# Patient Record
Sex: MALE | Race: WHITE | Employment: FULL TIME
[De-identification: names, ages, dates, MRNs, and addresses within clinical notes are randomized per-mention and may not be internally consistent; named-entity substitution may affect disease eponyms.]

---

## 2020-02-23 ENCOUNTER — NURSE TRIAGE (OUTPATIENT)
Dept: OTHER | Facility: CLINIC | Age: 60
End: 2020-02-23

## 2023-10-22 SDOH — HEALTH STABILITY: PHYSICAL HEALTH: ON AVERAGE, HOW MANY MINUTES DO YOU ENGAGE IN EXERCISE AT THIS LEVEL?: 50 MIN

## 2023-10-22 SDOH — HEALTH STABILITY: PHYSICAL HEALTH: ON AVERAGE, HOW MANY DAYS PER WEEK DO YOU ENGAGE IN MODERATE TO STRENUOUS EXERCISE (LIKE A BRISK WALK)?: 5 DAYS

## 2023-10-22 ASSESSMENT — SOCIAL DETERMINANTS OF HEALTH (SDOH)

## 2023-10-25 ENCOUNTER — OFFICE VISIT (OUTPATIENT)
Dept: FAMILY MEDICINE CLINIC | Age: 63
End: 2023-10-25
Payer: MEDICARE

## 2023-10-25 VITALS
WEIGHT: 210 LBS | OXYGEN SATURATION: 98 % | BODY MASS INDEX: 30.06 KG/M2 | SYSTOLIC BLOOD PRESSURE: 136 MMHG | DIASTOLIC BLOOD PRESSURE: 80 MMHG | HEART RATE: 62 BPM | RESPIRATION RATE: 16 BRPM | HEIGHT: 70 IN

## 2023-10-25 DIAGNOSIS — Z13.220 ENCOUNTER FOR SCREENING FOR LIPID DISORDER: ICD-10-CM

## 2023-10-25 DIAGNOSIS — I10 PRIMARY HYPERTENSION: Primary | ICD-10-CM

## 2023-10-25 DIAGNOSIS — Z12.5 SCREENING FOR PROSTATE CANCER: ICD-10-CM

## 2023-10-25 DIAGNOSIS — B35.6 TINEA CRURIS: ICD-10-CM

## 2023-10-25 PROCEDURE — 3079F DIAST BP 80-89 MM HG: CPT | Performed by: FAMILY MEDICINE

## 2023-10-25 PROCEDURE — G8417 CALC BMI ABV UP PARAM F/U: HCPCS | Performed by: FAMILY MEDICINE

## 2023-10-25 PROCEDURE — 3017F COLORECTAL CA SCREEN DOC REV: CPT | Performed by: FAMILY MEDICINE

## 2023-10-25 PROCEDURE — 3075F SYST BP GE 130 - 139MM HG: CPT | Performed by: FAMILY MEDICINE

## 2023-10-25 PROCEDURE — 99214 OFFICE O/P EST MOD 30 MIN: CPT | Performed by: FAMILY MEDICINE

## 2023-10-25 PROCEDURE — G8427 DOCREV CUR MEDS BY ELIG CLIN: HCPCS | Performed by: FAMILY MEDICINE

## 2023-10-25 PROCEDURE — G8484 FLU IMMUNIZE NO ADMIN: HCPCS | Performed by: FAMILY MEDICINE

## 2023-10-25 PROCEDURE — 1036F TOBACCO NON-USER: CPT | Performed by: FAMILY MEDICINE

## 2023-10-25 RX ORDER — CITALOPRAM 20 MG/1
20 TABLET ORAL DAILY
COMMUNITY
Start: 2023-08-01

## 2023-10-25 RX ORDER — AMLODIPINE BESYLATE 5 MG/1
5 TABLET ORAL DAILY
COMMUNITY
Start: 2023-08-08

## 2023-10-25 RX ORDER — ESCITALOPRAM OXALATE 20 MG/1
10 TABLET ORAL DAILY
COMMUNITY

## 2023-10-25 RX ORDER — LISINOPRIL 5 MG/1
5 TABLET ORAL DAILY
COMMUNITY
Start: 2023-08-08

## 2023-10-25 SDOH — ECONOMIC STABILITY: FOOD INSECURITY: WITHIN THE PAST 12 MONTHS, THE FOOD YOU BOUGHT JUST DIDN'T LAST AND YOU DIDN'T HAVE MONEY TO GET MORE.: NEVER TRUE

## 2023-10-25 SDOH — ECONOMIC STABILITY: HOUSING INSECURITY
IN THE LAST 12 MONTHS, WAS THERE A TIME WHEN YOU DID NOT HAVE A STEADY PLACE TO SLEEP OR SLEPT IN A SHELTER (INCLUDING NOW)?: NO

## 2023-10-25 SDOH — ECONOMIC STABILITY: FOOD INSECURITY: WITHIN THE PAST 12 MONTHS, YOU WORRIED THAT YOUR FOOD WOULD RUN OUT BEFORE YOU GOT MONEY TO BUY MORE.: NEVER TRUE

## 2023-10-25 SDOH — ECONOMIC STABILITY: INCOME INSECURITY: HOW HARD IS IT FOR YOU TO PAY FOR THE VERY BASICS LIKE FOOD, HOUSING, MEDICAL CARE, AND HEATING?: NOT HARD AT ALL

## 2023-10-25 ASSESSMENT — PATIENT HEALTH QUESTIONNAIRE - PHQ9
SUM OF ALL RESPONSES TO PHQ QUESTIONS 1-9: 0
2. FEELING DOWN, DEPRESSED OR HOPELESS: 0
SUM OF ALL RESPONSES TO PHQ9 QUESTIONS 1 & 2: 0
1. LITTLE INTEREST OR PLEASURE IN DOING THINGS: 0
SUM OF ALL RESPONSES TO PHQ QUESTIONS 1-9: 0

## 2023-10-25 ASSESSMENT — ENCOUNTER SYMPTOMS
WHEEZING: 0
NAUSEA: 0
DIARRHEA: 0
CONSTIPATION: 0
VOMITING: 0
ABDOMINAL PAIN: 0
SHORTNESS OF BREATH: 0
COUGH: 0

## 2023-10-25 NOTE — PROGRESS NOTES
Sylvester Manuel (:  1960) is a 61 y.o. male,Established patient, here for evaluation of the following chief complaint(s):  Establish Care (No concerns)      Subjective   SUBJECTIVE/OBJECTIVE:  HPI    BPs have been fine when checking, slightly elevated today but just had a stressful meeting at work  Still working out 3-5 times per week (doing some cardio and strength training), has increased his protein but hasn't noticed any improvement in his muscle mass  Tolerating medications well  Taking medications every day yes, compliant  Last Cr 0.9  Last K 4.1  Last LDL 96  Last glucose 92  Additional concerns tinea cruris (responded well in the past to nystatin-triamcinolone cream)     Review of Systems   Constitutional:  Negative for activity change, fatigue and unexpected weight change. Respiratory:  Negative for cough, shortness of breath and wheezing. Cardiovascular:  Negative for chest pain, palpitations and leg swelling. Gastrointestinal:  Negative for abdominal pain, constipation, diarrhea, nausea and vomiting. Skin:  Positive for rash (mild itching around rectum and genitals). Neurological:  Negative for dizziness, light-headedness and headaches. Objective   Physical Exam  Constitutional:       Appearance: Normal appearance. Neck:      Vascular: No carotid bruit. Cardiovascular:      Rate and Rhythm: Normal rate and regular rhythm. Heart sounds: No murmur heard. No gallop. Pulmonary:      Effort: Pulmonary effort is normal.      Breath sounds: Normal breath sounds. No wheezing, rhonchi or rales. Abdominal:      General: Abdomen is flat. Bowel sounds are normal. There is no distension. Palpations: Abdomen is soft. Tenderness: There is no abdominal tenderness. There is no guarding. Musculoskeletal:      Right lower leg: No edema. Left lower leg: No edema. Skin:     General: Skin is warm. Neurological:      Mental Status: He is alert.    Psychiatric:

## 2024-04-22 LAB
ALBUMIN SERPL-MCNC: 4.5 G/DL
ALP BLD-CCNC: 92 U/L
ALT SERPL-CCNC: 61 U/L
ANION GAP SERPL CALCULATED.3IONS-SCNC: 7 MMOL/L
AST SERPL-CCNC: 41 U/L
BASOPHILS ABSOLUTE: 0.05 /ΜL
BASOPHILS RELATIVE PERCENT: 1 %
BILIRUB SERPL-MCNC: 0.4 MG/DL (ref 0.1–1.4)
BUN BLDV-MCNC: 21 MG/DL
CALCIUM SERPL-MCNC: 9.4 MG/DL
CHLORIDE BLD-SCNC: 103 MMOL/L
CHOLESTEROL, TOTAL: 189 MG/DL
CHOLESTEROL/HDL RATIO: 4.1
CO2: 29 MMOL/L
CREAT SERPL-MCNC: 1.03 MG/DL
EGFR: 82
EOSINOPHILS ABSOLUTE: 0.32 /ΜL
EOSINOPHILS RELATIVE PERCENT: 6.2 %
GLUCOSE BLD-MCNC: 96 MG/DL
HCT VFR BLD CALC: 46.5 % (ref 41–53)
HDLC SERPL-MCNC: 46 MG/DL (ref 35–70)
HEMOGLOBIN: 16 G/DL (ref 13.5–17.5)
LDL CHOLESTEROL CALCULATED: 116 MG/DL (ref 0–160)
LYMPHOCYTES ABSOLUTE: 1.72 /ΜL
LYMPHOCYTES RELATIVE PERCENT: 33.4 %
MCH RBC QN AUTO: 32.7 PG
MCHC RBC AUTO-ENTMCNC: 34.4 G/DL
MCV RBC AUTO: 95.1 FL
MONOCYTES ABSOLUTE: 0.6 /ΜL
MONOCYTES RELATIVE PERCENT: 11.7 %
NEUTROPHILS ABSOLUTE: 2.44 /ΜL
NEUTROPHILS RELATIVE PERCENT: 47.3 %
NONHDLC SERPL-MCNC: NORMAL MG/DL
PDW BLD-RTO: 12.2 %
PLATELET # BLD: 255 K/ΜL
PMV BLD AUTO: 10.1 FL
POTASSIUM SERPL-SCNC: 4.7 MMOL/L
PROSTATE SPECIFIC ANTIGEN: 1.53 NG/ML
RBC # BLD: 4.89 10^6/ΜL
SODIUM BLD-SCNC: 139 MMOL/L
TOTAL PROTEIN: 6.9
TRIGL SERPL-MCNC: 135 MG/DL
VLDLC SERPL CALC-MCNC: 27 MG/DL
WBC # BLD: 5.2 10^3/ML

## 2024-04-23 ENCOUNTER — TELEPHONE (OUTPATIENT)
Dept: FAMILY MEDICINE CLINIC | Age: 64
End: 2024-04-23

## 2024-04-23 DIAGNOSIS — I10 PRIMARY HYPERTENSION: ICD-10-CM

## 2024-04-23 DIAGNOSIS — Z12.5 SCREENING FOR PROSTATE CANCER: ICD-10-CM

## 2024-04-23 DIAGNOSIS — Z13.220 ENCOUNTER FOR SCREENING FOR LIPID DISORDER: ICD-10-CM

## 2024-04-23 RX ORDER — CITALOPRAM 20 MG/1
20 TABLET ORAL DAILY
Qty: 90 TABLET | Refills: 3 | Status: SHIPPED | OUTPATIENT
Start: 2024-04-23

## 2024-04-23 NOTE — TELEPHONE ENCOUNTER
Pt called in asking for RF on Citalopram 20mg be sent to [facility] pharmacy.

## 2024-05-08 ENCOUNTER — TELEPHONE (OUTPATIENT)
Dept: FAMILY MEDICINE CLINIC | Age: 64
End: 2024-05-08

## 2024-05-08 DIAGNOSIS — I10 PRIMARY HYPERTENSION: Primary | ICD-10-CM

## 2024-05-08 RX ORDER — AMLODIPINE BESYLATE 5 MG/1
5 TABLET ORAL DAILY
Qty: 90 TABLET | Refills: 3 | Status: SHIPPED | OUTPATIENT
Start: 2024-05-08

## 2024-05-08 RX ORDER — LISINOPRIL 5 MG/1
5 TABLET ORAL DAILY
Qty: 90 TABLET | Refills: 3 | Status: SHIPPED | OUTPATIENT
Start: 2024-05-08

## 2024-05-08 NOTE — TELEPHONE ENCOUNTER
The patient called and said that he needs a refill on his amlodipine 5 mg, takes one tablet daily and lisinopril 5 mg, takes one tablet daily. He would like a 90 day supply with refills for a year sent to [facility] in [place], [place].

## 2024-05-08 NOTE — TELEPHONE ENCOUNTER
I called Antwan and let him know that Dr. Gordon sent in refills for his Lisinopril and Amlodipine and stated that he needed an annual physical due next month. Antwan scheduled the annual physical for 6/26/2024 and stated he already had some labs done in April.

## 2024-05-22 ENCOUNTER — OFFICE VISIT (OUTPATIENT)
Dept: FAMILY MEDICINE CLINIC | Age: 64
End: 2024-05-22
Payer: MEDICARE

## 2024-05-22 VITALS
OXYGEN SATURATION: 96 % | RESPIRATION RATE: 14 BRPM | BODY MASS INDEX: 29.35 KG/M2 | DIASTOLIC BLOOD PRESSURE: 80 MMHG | WEIGHT: 205 LBS | TEMPERATURE: 97.5 F | SYSTOLIC BLOOD PRESSURE: 132 MMHG | HEIGHT: 70 IN | HEART RATE: 78 BPM

## 2024-05-22 DIAGNOSIS — M25.561 ACUTE PAIN OF RIGHT KNEE: Primary | ICD-10-CM

## 2024-05-22 PROCEDURE — 1036F TOBACCO NON-USER: CPT | Performed by: FAMILY MEDICINE

## 2024-05-22 PROCEDURE — 3017F COLORECTAL CA SCREEN DOC REV: CPT | Performed by: FAMILY MEDICINE

## 2024-05-22 PROCEDURE — 3079F DIAST BP 80-89 MM HG: CPT | Performed by: FAMILY MEDICINE

## 2024-05-22 PROCEDURE — 3075F SYST BP GE 130 - 139MM HG: CPT | Performed by: FAMILY MEDICINE

## 2024-05-22 PROCEDURE — G8427 DOCREV CUR MEDS BY ELIG CLIN: HCPCS | Performed by: FAMILY MEDICINE

## 2024-05-22 PROCEDURE — 99213 OFFICE O/P EST LOW 20 MIN: CPT | Performed by: FAMILY MEDICINE

## 2024-05-22 PROCEDURE — G8417 CALC BMI ABV UP PARAM F/U: HCPCS | Performed by: FAMILY MEDICINE

## 2024-05-22 ASSESSMENT — PATIENT HEALTH QUESTIONNAIRE - PHQ9
2. FEELING DOWN, DEPRESSED OR HOPELESS: NOT AT ALL
SUM OF ALL RESPONSES TO PHQ QUESTIONS 1-9: 0
1. LITTLE INTEREST OR PLEASURE IN DOING THINGS: NOT AT ALL
SUM OF ALL RESPONSES TO PHQ QUESTIONS 1-9: 0
SUM OF ALL RESPONSES TO PHQ9 QUESTIONS 1 & 2: 0

## 2024-05-22 ASSESSMENT — ENCOUNTER SYMPTOMS
BACK PAIN: 0
WHEEZING: 0
COUGH: 0
SHORTNESS OF BREATH: 0

## 2024-05-22 NOTE — PROGRESS NOTES
Alon Leal (:  1960) is a 63 y.o. 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   Respiratory:  Negative for cough, shortness of breath and wheezing.    Cardiovascular:  Negative for chest pain, palpitations and leg swelling.   Genitourinary:         No bowel or bladder changes   Musculoskeletal:  Negative for arthralgias, back pain, gait problem and myalgias.        R knee pain, mild discomfort R upper thigh          Objective   Physical Exam  Constitutional:       Appearance: He is obese.   HENT:      Head: Normocephalic and atraumatic.   Cardiovascular:      Rate and Rhythm: Normal rate and regular rhythm.   Pulmonary:      Effort: Pulmonary effort is normal.      Breath sounds: Normal breath sounds.   Musculoskeletal:         General: Swelling (mild swelling of the R knee, fullness of the right thigh) present.      Right lower leg: No edema.      Left lower leg: No edema.      Comments: MS:  No appreciated ligament laxity, no crepitus with motion through both knees   Neurological:      Mental Status: He is alert.            Assessment & Plan   ASSESSMENT/PLAN:  1. Acute pain of right knee  -     XR KNEE RIGHT (3 VIEWS); Future  Discussed with patient that this may just be from

## 2024-06-10 ENCOUNTER — TELEPHONE (OUTPATIENT)
Dept: FAMILY MEDICINE CLINIC | Age: 64
End: 2024-06-10

## 2024-06-10 ENCOUNTER — HOSPITAL ENCOUNTER (OUTPATIENT)
Age: 64
Discharge: HOME | End: 2024-06-10
Payer: COMMERCIAL

## 2024-06-10 ENCOUNTER — HOSPITAL ENCOUNTER (OUTPATIENT)
Dept: GENERAL RADIOLOGY | Age: 64
Discharge: HOME | End: 2024-06-10
Payer: COMMERCIAL

## 2024-06-10 DIAGNOSIS — M25.561 ACUTE PAIN OF RIGHT KNEE: ICD-10-CM

## 2024-06-10 PROCEDURE — 73562 X-RAY EXAM OF KNEE 3: CPT

## 2024-06-10 NOTE — TELEPHONE ENCOUNTER
----- Message from CJ Mondragon CNP sent at 6/10/2024 11:34 AM EDT -----  Chronic degenerative changes noted in the knee. No fracture or acute change noted.

## 2024-06-10 NOTE — TELEPHONE ENCOUNTER
I copy and pasted Елена's response regarding the patient's X Ray Knee Right results and sent them to the patient via My Chart.

## 2024-06-11 ENCOUNTER — TELEPHONE (OUTPATIENT)
Dept: FAMILY MEDICINE CLINIC | Age: 64
End: 2024-06-11

## 2024-06-11 NOTE — TELEPHONE ENCOUNTER
----- Message from Saritashad Cox Shawnwa sent at 6/11/2024  8:19 AM EDT -----  Regarding: ECC Message to Provider  ECC Message to Provider    Relationship to Patient: Self     Additional Information Pt wanted the practice or his pcp to call him with regards to his X ray.  --------------------------------------------------------------------------------------------------------------------------    Call Back Information: OK to leave message on voicemail  Preferred Call Back Number: Phone  [phone]

## 2024-06-26 ENCOUNTER — PATIENT MESSAGE (OUTPATIENT)
Dept: FAMILY MEDICINE CLINIC | Age: 64
End: 2024-06-26

## 2024-06-26 DIAGNOSIS — M25.561 ACUTE PAIN OF RIGHT KNEE: Primary | ICD-10-CM

## 2024-06-26 NOTE — TELEPHONE ENCOUNTER
From: Alon Leal  To: Dr. Belinda Gordon  Sent: 6/26/2024 2:40 PM EDT  Subject: Physical Therapy for my Knee    Would you please send a script for my knee therapy to   [facility]  Fax: [phone]  Attn Belinda Mack  Thank you

## 2024-07-11 ENCOUNTER — OFFICE VISIT (OUTPATIENT)
Dept: FAMILY MEDICINE CLINIC | Age: 64
End: 2024-07-11
Payer: COMMERCIAL

## 2024-07-11 VITALS
HEIGHT: 70 IN | HEART RATE: 80 BPM | RESPIRATION RATE: 14 BRPM | BODY MASS INDEX: 28.92 KG/M2 | DIASTOLIC BLOOD PRESSURE: 78 MMHG | SYSTOLIC BLOOD PRESSURE: 136 MMHG | WEIGHT: 202 LBS | TEMPERATURE: 98.5 F | OXYGEN SATURATION: 95 %

## 2024-07-11 DIAGNOSIS — C49.9 LIPOSARCOMA (HCC): ICD-10-CM

## 2024-07-11 DIAGNOSIS — Z86.718 HISTORY OF DVT (DEEP VEIN THROMBOSIS): ICD-10-CM

## 2024-07-11 DIAGNOSIS — I87.2 VENOUS INSUFFICIENCY OF RIGHT LOWER EXTREMITY: Primary | ICD-10-CM

## 2024-07-11 PROCEDURE — 3075F SYST BP GE 130 - 139MM HG: CPT | Performed by: FAMILY MEDICINE

## 2024-07-11 PROCEDURE — 3078F DIAST BP <80 MM HG: CPT | Performed by: FAMILY MEDICINE

## 2024-07-11 PROCEDURE — 1036F TOBACCO NON-USER: CPT | Performed by: FAMILY MEDICINE

## 2024-07-11 PROCEDURE — 3017F COLORECTAL CA SCREEN DOC REV: CPT | Performed by: FAMILY MEDICINE

## 2024-07-11 PROCEDURE — 99213 OFFICE O/P EST LOW 20 MIN: CPT | Performed by: FAMILY MEDICINE

## 2024-07-11 PROCEDURE — G8427 DOCREV CUR MEDS BY ELIG CLIN: HCPCS | Performed by: FAMILY MEDICINE

## 2024-07-11 PROCEDURE — G8417 CALC BMI ABV UP PARAM F/U: HCPCS | Performed by: FAMILY MEDICINE

## 2024-07-11 ASSESSMENT — ENCOUNTER SYMPTOMS
NAUSEA: 0
VOMITING: 0
WHEEZING: 0
COUGH: 0
DIARRHEA: 0
SHORTNESS OF BREATH: 0
ABDOMINAL PAIN: 0
CONSTIPATION: 0

## 2024-07-11 NOTE — PROGRESS NOTES
Health Maintenance Due   Topic Date Due    COVID-19 Vaccine (1) Never done    HIV screen  Never done    Hepatitis C screen  Never done    DTaP/Tdap/Td vaccine (1 - Tdap) Never done    Colorectal Cancer Screen  Done, records being obtained    Respiratory Syncytial Virus (RSV) Pregnant or age 60 yrs+ (1 - 1-dose 60+ series) Never done      Health maintenance reviewed 7/11/2024. Patient health hx and surgeries reviewed. No changes.  
lower leg: No edema.      Left lower leg: No edema.      Comments: No appreciated swelling of the knee joint on the right  R upper thigh looks slightly larger than the left thigh, no appreciated firmness or tenderness (patient reports better with compression)   Skin:     General: Skin is warm.      Findings: No erythema, lesion or rash.   Neurological:      Mental Status: He is alert.   Psychiatric:         Mood and Affect: Mood normal.            Assessment & Plan   ASSESSMENT/PLAN:  1. Venous insufficiency of right lower extremity  2. History of Liposarcoma  3. History of DVT (deep vein thrombosis)  Discussed with patient that low concern about blood clot or recurrence at this time.  Suspect problems are related to venous damage from old mass that was removed, especially since improved with two days of compression.  Discussed seeing vein specialist if symptoms persist for ultrasound evaluation and other treatment options.  Consider MRI if any firmness developed.  Patient will call if any changes or referral needed    Return if symptoms worsen or fail to improve.         On this date 7/11/2024 I have spent 20 minutes reviewing previous notes, test results and face to face with the patient discussing the diagnosis and importance of compliance with the treatment plan as well as documenting on the day of the visit.      An electronic signature was used to authenticate this note.    --Belinda Gordon, DO

## 2024-07-29 ENCOUNTER — PATIENT MESSAGE (OUTPATIENT)
Dept: FAMILY MEDICINE CLINIC | Age: 64
End: 2024-07-29

## 2024-07-29 DIAGNOSIS — I87.2 VENOUS INSUFFICIENCY OF RIGHT LOWER EXTREMITY: Primary | ICD-10-CM

## 2024-07-29 DIAGNOSIS — Z86.718 HISTORY OF DVT (DEEP VEIN THROMBOSIS): ICD-10-CM

## 2024-07-29 DIAGNOSIS — C49.9 LIPOSARCOMA (HCC): ICD-10-CM

## 2024-08-01 ENCOUNTER — PATIENT MESSAGE (OUTPATIENT)
Dept: FAMILY MEDICINE CLINIC | Age: 64
End: 2024-08-01

## 2024-08-01 DIAGNOSIS — C49.9 LIPOSARCOMA (HCC): Primary | ICD-10-CM

## 2024-08-01 DIAGNOSIS — R22.41 LEG MASS, RIGHT: ICD-10-CM

## 2024-08-01 NOTE — TELEPHONE ENCOUNTER
From: Alon Leal  To: Dr. Belinda Gordon  Sent: 8/1/2024 7:49 AM EDT  Subject: Right Thigh    Dr Gordon:  I had my appointment at the [facility] yesterday afternoon. They did their ultrasound and was not able to find anything that would cause the increase size.  All the veins looked good no blood clots. It is 9CM larger than the left one.  What do you suggest I do not notice it get any larger in size. If you really think I need the MRI as much as I don't want to I will. If you do suggest that I would like to go to an open MRI unit I guess [facility] has one.    Thank you

## 2024-08-01 NOTE — TELEPHONE ENCOUNTER
If they didn't find any reason for the increase in size then I would recommend doing an MRI at this time---I can get it ordered and sent to [facility]

## 2024-08-02 ENCOUNTER — PATIENT MESSAGE (OUTPATIENT)
Dept: FAMILY MEDICINE CLINIC | Age: 64
End: 2024-08-02

## 2024-08-02 DIAGNOSIS — R22.41 LEG MASS, RIGHT: Primary | ICD-10-CM

## 2024-08-02 DIAGNOSIS — C49.9 LIPOSARCOMA (HCC): ICD-10-CM

## 2024-08-05 NOTE — TELEPHONE ENCOUNTER
We have already start the prior authorization for the MRI at [facility] (I just forgot to put a note in the order that you weren't doing it at [facility])---we are still waiting on your insurance for the approval code

## 2024-08-07 NOTE — TELEPHONE ENCOUNTER
We haven't received the paperwork stating why it was denied yet---just that it was denied.  They often send us something within a few days  I would start with seeing someone at [facility] (they may be able to do an ultrasound to get a better measurement of the thigh so it can be followed better than we can do with just the naked eye)  I can do a referral to them if you would like to go that route rather than going back to see Dr Pierce

## 2024-08-21 ENCOUNTER — PATIENT MESSAGE (OUTPATIENT)
Dept: FAMILY MEDICINE CLINIC | Age: 64
End: 2024-08-21

## 2024-11-04 SDOH — ECONOMIC STABILITY: INCOME INSECURITY: HOW HARD IS IT FOR YOU TO PAY FOR THE VERY BASICS LIKE FOOD, HOUSING, MEDICAL CARE, AND HEATING?: PATIENT DECLINED

## 2024-11-04 SDOH — ECONOMIC STABILITY: FOOD INSECURITY: WITHIN THE PAST 12 MONTHS, YOU WORRIED THAT YOUR FOOD WOULD RUN OUT BEFORE YOU GOT MONEY TO BUY MORE.: PATIENT DECLINED

## 2024-11-04 SDOH — ECONOMIC STABILITY: TRANSPORTATION INSECURITY
IN THE PAST 12 MONTHS, HAS LACK OF TRANSPORTATION KEPT YOU FROM MEETINGS, WORK, OR FROM GETTING THINGS NEEDED FOR DAILY LIVING?: PATIENT DECLINED

## 2024-11-04 SDOH — ECONOMIC STABILITY: FOOD INSECURITY: WITHIN THE PAST 12 MONTHS, THE FOOD YOU BOUGHT JUST DIDN'T LAST AND YOU DIDN'T HAVE MONEY TO GET MORE.: PATIENT DECLINED

## 2024-11-05 ENCOUNTER — OFFICE VISIT (OUTPATIENT)
Dept: FAMILY MEDICINE CLINIC | Age: 64
End: 2024-11-05

## 2024-11-05 VITALS
SYSTOLIC BLOOD PRESSURE: 144 MMHG | OXYGEN SATURATION: 93 % | RESPIRATION RATE: 16 BRPM | BODY MASS INDEX: 29.63 KG/M2 | TEMPERATURE: 98 F | HEIGHT: 70 IN | DIASTOLIC BLOOD PRESSURE: 82 MMHG | HEART RATE: 77 BPM | WEIGHT: 207 LBS

## 2024-11-05 DIAGNOSIS — H61.23 BILATERAL IMPACTED CERUMEN: Primary | Chronic | ICD-10-CM

## 2024-11-05 DIAGNOSIS — H92.03 DISCOMFORT OF BOTH EARS: ICD-10-CM

## 2024-11-05 DIAGNOSIS — C49.21: ICD-10-CM

## 2024-11-05 RX ORDER — CYCLOBENZAPRINE HCL 5 MG
5 TABLET ORAL 3 TIMES DAILY PRN
COMMUNITY
Start: 2024-10-02

## 2024-11-05 RX ORDER — OMEPRAZOLE 10 MG/1
5 CAPSULE, DELAYED RELEASE ORAL DAILY
COMMUNITY

## 2024-11-05 ASSESSMENT — ENCOUNTER SYMPTOMS
WHEEZING: 0
CONSTIPATION: 0
SINUS PAIN: 0
SHORTNESS OF BREATH: 0
NAUSEA: 0
RHINORRHEA: 1
TROUBLE SWALLOWING: 0
DIARRHEA: 0
SINUS PRESSURE: 0
CHEST TIGHTNESS: 0
VOMITING: 0
SORE THROAT: 0

## 2024-11-05 NOTE — PROGRESS NOTES
Alon Leal (:  1960) is a 64 y.o. male,Established patient, here for evaluation of the following chief complaint(s):  Ear Fullness      Subjective   SUBJECTIVE/OBJECTIVE:  HPI  Patient presents with ear symptoms for several weeks---pressure bilaterally, no noted hearing difficulties  Known exposures problems with wax buildup  Treatment nothing  Feels fine, still exercising  Some mild allergy symptoms with the weather changing  Healing from recent lipoma removal    Review of Systems   Constitutional:  Positive for fatigue. Negative for activity change, chills and fever.   HENT:  Positive for ear pain (discomfort, fullness) and rhinorrhea. Negative for congestion, postnasal drip, sinus pressure, sinus pain, sneezing, sore throat and trouble swallowing.    Respiratory:  Negative for chest tightness, shortness of breath and wheezing.    Cardiovascular:  Negative for chest pain and leg swelling.   Gastrointestinal:  Negative for constipation, diarrhea, nausea and vomiting.   Musculoskeletal:  Negative for arthralgias.   Skin:  Negative for rash.   Neurological:  Negative for dizziness, light-headedness and headaches.   Hematological:  Negative for adenopathy.          Objective   Physical Exam  Constitutional:       General: He is not in acute distress.     Appearance: Normal appearance. He is normal weight. He is not ill-appearing.   HENT:      Head: Normocephalic and atraumatic.      Right Ear: There is impacted cerumen.      Left Ear: There is impacted cerumen.      Nose: Rhinorrhea present. No mucosal edema or congestion. Rhinorrhea is clear.      Right Turbinates: Swollen.      Left Turbinates: Swollen.      Right Sinus: No maxillary sinus tenderness or frontal sinus tenderness.      Left Sinus: No maxillary sinus tenderness or frontal sinus tenderness.   Eyes:      Extraocular Movements: Extraocular movements intact.      Pupils: Pupils are equal, round, and reactive to light.   Cardiovascular:

## 2024-11-20 ENCOUNTER — OFFICE VISIT (OUTPATIENT)
Dept: FAMILY MEDICINE CLINIC | Age: 64
End: 2024-11-20
Payer: COMMERCIAL

## 2024-11-20 VITALS
WEIGHT: 201 LBS | RESPIRATION RATE: 14 BRPM | TEMPERATURE: 97.4 F | HEIGHT: 70 IN | SYSTOLIC BLOOD PRESSURE: 132 MMHG | OXYGEN SATURATION: 98 % | DIASTOLIC BLOOD PRESSURE: 74 MMHG | HEART RATE: 76 BPM | BODY MASS INDEX: 28.77 KG/M2

## 2024-11-20 DIAGNOSIS — J40 BRONCHITIS: Primary | ICD-10-CM

## 2024-11-20 PROCEDURE — 3075F SYST BP GE 130 - 139MM HG: CPT | Performed by: FAMILY MEDICINE

## 2024-11-20 PROCEDURE — 99213 OFFICE O/P EST LOW 20 MIN: CPT | Performed by: FAMILY MEDICINE

## 2024-11-20 PROCEDURE — 3078F DIAST BP <80 MM HG: CPT | Performed by: FAMILY MEDICINE

## 2024-11-20 RX ORDER — AZITHROMYCIN 250 MG/1
TABLET, FILM COATED ORAL
Qty: 6 TABLET | Refills: 0 | Status: SHIPPED | OUTPATIENT
Start: 2024-11-20 | End: 2024-11-25

## 2024-11-20 ASSESSMENT — ENCOUNTER SYMPTOMS
TROUBLE SWALLOWING: 0
DIARRHEA: 0
RHINORRHEA: 1
SHORTNESS OF BREATH: 0
WHEEZING: 0
SINUS PRESSURE: 0
SORE THROAT: 1
CONSTIPATION: 0
CHEST TIGHTNESS: 0
SINUS PAIN: 0
COUGH: 1
VOMITING: 0
NAUSEA: 0

## 2024-11-20 NOTE — PROGRESS NOTES
Alon Leal (:  1960) is a 64 y.o. male,Established patient, here for evaluation of the following chief complaint(s):  Cough, Congestion, and Ear Fullness      Subjective   SUBJECTIVE/OBJECTIVE:  HPI  Patient presents with cold symptoms for 9 days  Known exposures weather changes, wife with cold symptoms initially  Treatment Nyquil and Dayquil  Better mild help with meds  Worse last few days, coughing more    Review of Systems   Constitutional:  Positive for fatigue. Negative for activity change, chills and fever.   HENT:  Positive for congestion, ear pain (pressure bilaterally), rhinorrhea and sore throat (scratchy). Negative for postnasal drip, sinus pressure, sinus pain, sneezing and trouble swallowing.    Respiratory:  Positive for cough. Negative for chest tightness, shortness of breath and wheezing.    Cardiovascular:  Negative for chest pain, palpitations and leg swelling.   Gastrointestinal:  Negative for constipation, diarrhea, nausea and vomiting.   Musculoskeletal:  Negative for arthralgias.   Skin:  Negative for rash.   Neurological:  Positive for dizziness (with motion, mild). Negative for light-headedness and headaches.   Hematological:  Negative for adenopathy.          Objective   Physical Exam  Vitals reviewed.   Constitutional:       General: He is not in acute distress.     Appearance: Normal appearance. He is normal weight. He is not ill-appearing.   HENT:      Head: Normocephalic and atraumatic.      Right Ear: A middle ear effusion is present.      Left Ear: A middle ear effusion is present.      Nose: Mucosal edema, congestion and rhinorrhea present. Rhinorrhea is purulent.      Right Turbinates: Swollen.      Left Turbinates: Swollen.      Right Sinus: No maxillary sinus tenderness or frontal sinus tenderness.      Left Sinus: No maxillary sinus tenderness or frontal sinus tenderness.      Mouth/Throat:      Pharynx: Posterior oropharyngeal erythema (posterior cobblestoning)

## 2024-12-02 ENCOUNTER — PATIENT MESSAGE (OUTPATIENT)
Dept: FAMILY MEDICINE CLINIC | Age: 64
End: 2024-12-02

## 2024-12-02 RX ORDER — NYSTATIN AND TRIAMCINOLONE ACETONIDE 100000; 1 [USP'U]/G; MG/G
CREAM TOPICAL
Qty: 60 G | Refills: 1 | Status: SHIPPED | OUTPATIENT
Start: 2024-12-02

## 2024-12-09 ENCOUNTER — PATIENT MESSAGE (OUTPATIENT)
Dept: FAMILY MEDICINE CLINIC | Age: 64
End: 2024-12-09

## 2024-12-09 DIAGNOSIS — I10 PRIMARY HYPERTENSION: ICD-10-CM

## 2024-12-09 RX ORDER — LISINOPRIL 5 MG/1
10 TABLET ORAL DAILY
Qty: 90 TABLET | Refills: 3 | Status: SHIPPED
Start: 2024-12-09 | End: 2024-12-12 | Stop reason: SDUPTHER

## 2024-12-12 ENCOUNTER — OFFICE VISIT (OUTPATIENT)
Dept: FAMILY MEDICINE CLINIC | Age: 64
End: 2024-12-12
Payer: COMMERCIAL

## 2024-12-12 VITALS
DIASTOLIC BLOOD PRESSURE: 78 MMHG | OXYGEN SATURATION: 95 % | BODY MASS INDEX: 28.84 KG/M2 | WEIGHT: 201 LBS | SYSTOLIC BLOOD PRESSURE: 148 MMHG | TEMPERATURE: 98.6 F | HEART RATE: 71 BPM

## 2024-12-12 DIAGNOSIS — J40 BRONCHITIS: Primary | ICD-10-CM

## 2024-12-12 DIAGNOSIS — I10 PRIMARY HYPERTENSION: ICD-10-CM

## 2024-12-12 PROBLEM — M17.11 PRIMARY OSTEOARTHRITIS OF RIGHT KNEE: Status: ACTIVE | Noted: 2024-12-12

## 2024-12-12 PROCEDURE — 3078F DIAST BP <80 MM HG: CPT | Performed by: FAMILY MEDICINE

## 2024-12-12 PROCEDURE — 99213 OFFICE O/P EST LOW 20 MIN: CPT | Performed by: FAMILY MEDICINE

## 2024-12-12 PROCEDURE — 3077F SYST BP >= 140 MM HG: CPT | Performed by: FAMILY MEDICINE

## 2024-12-12 RX ORDER — ALBUTEROL SULFATE 90 UG/1
2 INHALANT RESPIRATORY (INHALATION) 4 TIMES DAILY PRN
Qty: 18 G | Refills: 0 | Status: SHIPPED | OUTPATIENT
Start: 2024-12-12

## 2024-12-12 RX ORDER — LISINOPRIL 5 MG/1
10 TABLET ORAL DAILY
Qty: 180 TABLET | Refills: 3 | Status: SHIPPED | OUTPATIENT
Start: 2024-12-12

## 2024-12-12 RX ORDER — CEPHALEXIN 500 MG/1
500 CAPSULE ORAL 2 TIMES DAILY
Qty: 20 CAPSULE | Refills: 0 | Status: SHIPPED | OUTPATIENT
Start: 2024-12-12 | End: 2024-12-22

## 2024-12-12 ASSESSMENT — ENCOUNTER SYMPTOMS
SORE THROAT: 1
WHEEZING: 0
DIARRHEA: 0
SHORTNESS OF BREATH: 0
RHINORRHEA: 1
CONSTIPATION: 0
NAUSEA: 0
TROUBLE SWALLOWING: 0
VOMITING: 0
CHEST TIGHTNESS: 0
SINUS PRESSURE: 1
SINUS PAIN: 0
COUGH: 1

## 2024-12-12 NOTE — PROGRESS NOTES
Alon Leal (:  1960) is a 64 y.o. male,Established patient, here for evaluation of the following chief complaint(s):  Cough (Cough and congestion X 2 days. Coughing keeping patient up at night. Pressure in ears. )      Subjective   SUBJECTIVE/OBJECTIVE:  HPI  Patient presents with cold symptoms for 2 days  Known exposures lots of stress---just found out employee has been using company credit card for personal use  BPs have been 140s/70s at home---just started to do 2 lisinopril  Treatment Mucinex, Nyquil  Better minimal help with meds  Worse into chest today, coughing up yellow/green phlegm, nearly to the point of vomiting with cough  Didn't exercise yesterday due to feeling so poorly (never misses exercise)    Review of Systems   Constitutional:  Positive for activity change and fatigue. Negative for appetite change, chills and fever.   HENT:  Positive for congestion, ear pain (pressure), rhinorrhea, sinus pressure and sore throat. Negative for postnasal drip, sinus pain, sneezing and trouble swallowing.    Respiratory:  Positive for cough. Negative for chest tightness, shortness of breath and wheezing.    Cardiovascular:  Negative for chest pain, palpitations and leg swelling.   Gastrointestinal:  Negative for constipation, diarrhea, nausea and vomiting.   Musculoskeletal:  Negative for arthralgias.   Skin:  Negative for rash.   Neurological:  Positive for dizziness (with motion) and headaches (sinus). Negative for light-headedness.   Hematological:  Negative for adenopathy.          Objective   Physical Exam  Vitals reviewed.   Constitutional:       General: He is not in acute distress.     Appearance: Normal appearance. He is normal weight. He is not ill-appearing.   HENT:      Head: Normocephalic and atraumatic.      Right Ear: A middle ear effusion is present.      Left Ear: A middle ear effusion is present.      Nose: Mucosal edema, congestion and rhinorrhea present. Rhinorrhea is purulent.

## 2025-01-08 ENCOUNTER — PATIENT MESSAGE (OUTPATIENT)
Dept: FAMILY MEDICINE CLINIC | Age: 65
End: 2025-01-08

## 2025-01-08 DIAGNOSIS — I10 PRIMARY HYPERTENSION: ICD-10-CM

## 2025-01-08 RX ORDER — LISINOPRIL 10 MG/1
10 TABLET ORAL DAILY
Qty: 90 TABLET | Refills: 3 | Status: SHIPPED | OUTPATIENT
Start: 2025-01-08

## 2025-01-21 RX ORDER — NYSTATIN AND TRIAMCINOLONE ACETONIDE 100000; 1 [USP'U]/G; MG/G
CREAM TOPICAL
Qty: 60 G | Refills: 0 | Status: SHIPPED | OUTPATIENT
Start: 2025-01-21

## 2025-01-21 NOTE — TELEPHONE ENCOUNTER
Alon Leal needs refill of   Requested Prescriptions     Pending Prescriptions Disp Refills    nystatin-triamcinolone (MYCOLOG II) 450993-0.1 UNIT/GM-% cream [Pharmacy Med Name: Nystatin-Triamcinolone 247628-8.1 UNIT/GM-% External Cream] 60 g 0     Sig: APPLY  CREAM TOPICALLY TWICE DAILY       Last Filled on:  12- #60 g R-1 and sent to [facility] in [place], [place] by Belinda Gordon DO.      Last Visit Date:  12/12/2024    Next Visit Date:  Visit date not found

## 2025-02-25 ENCOUNTER — OFFICE VISIT (OUTPATIENT)
Dept: FAMILY MEDICINE CLINIC | Age: 65
End: 2025-02-25
Payer: COMMERCIAL

## 2025-02-25 VITALS
TEMPERATURE: 98.3 F | BODY MASS INDEX: 28.98 KG/M2 | HEART RATE: 81 BPM | OXYGEN SATURATION: 96 % | SYSTOLIC BLOOD PRESSURE: 160 MMHG | WEIGHT: 202 LBS | DIASTOLIC BLOOD PRESSURE: 82 MMHG

## 2025-02-25 DIAGNOSIS — C49.21: ICD-10-CM

## 2025-02-25 DIAGNOSIS — F41.9 ANXIETY: ICD-10-CM

## 2025-02-25 DIAGNOSIS — I10 PRIMARY HYPERTENSION: Primary | ICD-10-CM

## 2025-02-25 PROCEDURE — 3079F DIAST BP 80-89 MM HG: CPT | Performed by: FAMILY MEDICINE

## 2025-02-25 PROCEDURE — 3077F SYST BP >= 140 MM HG: CPT | Performed by: FAMILY MEDICINE

## 2025-02-25 PROCEDURE — 99215 OFFICE O/P EST HI 40 MIN: CPT | Performed by: FAMILY MEDICINE

## 2025-02-25 RX ORDER — KETOCONAZOLE 20 MG/G
CREAM TOPICAL DAILY
COMMUNITY
Start: 2025-02-19

## 2025-02-25 RX ORDER — LISINOPRIL 5 MG/1
5 TABLET ORAL DAILY
COMMUNITY
Start: 2025-01-23

## 2025-02-25 SDOH — ECONOMIC STABILITY: FOOD INSECURITY: WITHIN THE PAST 12 MONTHS, THE FOOD YOU BOUGHT JUST DIDN'T LAST AND YOU DIDN'T HAVE MONEY TO GET MORE.: NEVER TRUE

## 2025-02-25 SDOH — ECONOMIC STABILITY: FOOD INSECURITY: WITHIN THE PAST 12 MONTHS, YOU WORRIED THAT YOUR FOOD WOULD RUN OUT BEFORE YOU GOT MONEY TO BUY MORE.: NEVER TRUE

## 2025-02-25 ASSESSMENT — ENCOUNTER SYMPTOMS
COUGH: 0
CONSTIPATION: 0
ABDOMINAL PAIN: 0
WHEEZING: 0
DIARRHEA: 0
SHORTNESS OF BREATH: 0
VOMITING: 0
NAUSEA: 0

## 2025-02-25 ASSESSMENT — PATIENT HEALTH QUESTIONNAIRE - PHQ9
SUM OF ALL RESPONSES TO PHQ QUESTIONS 1-9: 7
10. IF YOU CHECKED OFF ANY PROBLEMS, HOW DIFFICULT HAVE THESE PROBLEMS MADE IT FOR YOU TO DO YOUR WORK, TAKE CARE OF THINGS AT HOME, OR GET ALONG WITH OTHER PEOPLE: NOT DIFFICULT AT ALL
1. LITTLE INTEREST OR PLEASURE IN DOING THINGS: MORE THAN HALF THE DAYS
SUM OF ALL RESPONSES TO PHQ QUESTIONS 1-9: 7
5. POOR APPETITE OR OVEREATING: NOT AT ALL
9. THOUGHTS THAT YOU WOULD BE BETTER OFF DEAD, OR OF HURTING YOURSELF: NOT AT ALL
6. FEELING BAD ABOUT YOURSELF - OR THAT YOU ARE A FAILURE OR HAVE LET YOURSELF OR YOUR FAMILY DOWN: SEVERAL DAYS
2. FEELING DOWN, DEPRESSED OR HOPELESS: MORE THAN HALF THE DAYS
7. TROUBLE CONCENTRATING ON THINGS, SUCH AS READING THE NEWSPAPER OR WATCHING TELEVISION: NOT AT ALL
8. MOVING OR SPEAKING SO SLOWLY THAT OTHER PEOPLE COULD HAVE NOTICED. OR THE OPPOSITE, BEING SO FIGETY OR RESTLESS THAT YOU HAVE BEEN MOVING AROUND A LOT MORE THAN USUAL: NOT AT ALL
SUM OF ALL RESPONSES TO PHQ9 QUESTIONS 1 & 2: 4
4. FEELING TIRED OR HAVING LITTLE ENERGY: NOT AT ALL
3. TROUBLE FALLING OR STAYING ASLEEP: MORE THAN HALF THE DAYS

## 2025-02-25 NOTE — PROGRESS NOTES
Alon Leal (:  1960) is a 64 y.o. male,Established patient, here for evaluation of the following chief complaint(s):  Anxiety (Anxiety and depression. /Would like to review MRI results on leg. )      Subjective   SUBJECTIVE/OBJECTIVE:  HPI  Struggling with his anxiety since having MRI done of his lower leg  Doesn't feel like his medications need adjusted just wants to talk about the tests  Reviewed MRI with patient---current was done last week, [facility]   Had surgery done in 10/2024  Discussed with patient that the one mass appears to be the same size as it was prior to surgery  The other mass is showing a much smaller size after the resection, some question about if the mass is getting bigger or if that much was left after having the surgery  The surgeon is concerned that it is a recurrence of the mass, he felt that he removed about 99%  The portion along the biceps femoris heads has been resected per the new MRI report  Patient was also told that he may need radiation and was concerned that meant there was cancer  Has an appointment to talk with his specialist tomorrow at 3 PM    Review of Systems   Constitutional:  Positive for fatigue (mild). Negative for activity change and unexpected weight change.   Respiratory:  Negative for cough, shortness of breath and wheezing.    Cardiovascular:  Negative for chest pain and palpitations.   Gastrointestinal:  Negative for abdominal pain, constipation, diarrhea, nausea and vomiting.   Neurological:  Negative for dizziness, light-headedness and headaches.   Psychiatric/Behavioral:  Positive for sleep disturbance (due to anxiety). The patient is nervous/anxious.           Objective   Physical Exam  Vitals reviewed.   Constitutional:       General: He is not in acute distress.     Appearance: Normal appearance. He is not ill-appearing.   Cardiovascular:      Rate and Rhythm: Normal rate and regular rhythm.      Heart sounds: No murmur

## 2025-04-01 ENCOUNTER — PATIENT MESSAGE (OUTPATIENT)
Dept: FAMILY MEDICINE CLINIC | Age: 65
End: 2025-04-01

## 2025-04-01 DIAGNOSIS — Z12.5 SCREENING FOR PROSTATE CANCER: ICD-10-CM

## 2025-04-01 DIAGNOSIS — Z13.220 ENCOUNTER FOR SCREENING FOR LIPID DISORDER: ICD-10-CM

## 2025-04-01 DIAGNOSIS — I10 PRIMARY HYPERTENSION: Primary | ICD-10-CM

## 2025-04-08 ENCOUNTER — PATIENT MESSAGE (OUTPATIENT)
Dept: FAMILY MEDICINE CLINIC | Age: 65
End: 2025-04-08

## 2025-04-08 DIAGNOSIS — I10 PRIMARY HYPERTENSION: Primary | ICD-10-CM

## 2025-04-08 RX ORDER — LISINOPRIL 10 MG/1
10 TABLET ORAL DAILY
Qty: 90 TABLET | Refills: 2 | Status: SHIPPED | OUTPATIENT
Start: 2025-04-08

## 2025-04-21 ENCOUNTER — PATIENT MESSAGE (OUTPATIENT)
Dept: FAMILY MEDICINE CLINIC | Age: 65
End: 2025-04-21

## 2025-04-21 RX ORDER — CITALOPRAM HYDROBROMIDE 20 MG/1
20 TABLET ORAL DAILY
Qty: 90 TABLET | Refills: 3 | Status: SHIPPED | OUTPATIENT
Start: 2025-04-21

## 2025-04-22 ENCOUNTER — RESULTS FOLLOW-UP (OUTPATIENT)
Dept: FAMILY MEDICINE CLINIC | Age: 65
End: 2025-04-22

## 2025-04-22 DIAGNOSIS — Z12.5 SCREENING FOR PROSTATE CANCER: ICD-10-CM

## 2025-04-22 DIAGNOSIS — Z13.220 ENCOUNTER FOR SCREENING FOR LIPID DISORDER: ICD-10-CM

## 2025-04-22 DIAGNOSIS — I10 PRIMARY HYPERTENSION: ICD-10-CM

## 2025-04-22 LAB
ALBUMIN: 3.9 G/DL
ALP BLD-CCNC: 94 U/L
ALT SERPL-CCNC: 114 U/L
ANION GAP SERPL CALCULATED.3IONS-SCNC: 8 MMOL/L
AST SERPL-CCNC: 58 U/L
BASOPHILS ABSOLUTE: 0.05 /ΜL
BASOPHILS RELATIVE PERCENT: 1 %
BILIRUB SERPL-MCNC: 0.5 MG/DL (ref 0.1–1.4)
BUN BLDV-MCNC: 14 MG/DL
CALCIUM SERPL-MCNC: 9.2 MG/DL
CHLORIDE BLD-SCNC: 106 MMOL/L
CHOLESTEROL, TOTAL: 207 MG/DL
CHOLESTEROL/HDL RATIO: 4.7
CO2: 29 MMOL/L
CREAT SERPL-MCNC: 0.94 MG/DL
EOSINOPHILS ABSOLUTE: 0.4 /ΜL
EOSINOPHILS RELATIVE PERCENT: 8 %
GFR, ESTIMATED: 91
GLUCOSE BLD-MCNC: 104 MG/DL
HCT VFR BLD CALC: 44.6 % (ref 41–53)
HDLC SERPL-MCNC: 44 MG/DL (ref 35–70)
HEMOGLOBIN: 14.8 G/DL (ref 13.5–17.5)
LDL CHOLESTEROL: 132
LYMPHOCYTES ABSOLUTE: 1.71 /ΜL
LYMPHOCYTES RELATIVE PERCENT: 34 %
MCH RBC QN AUTO: 28.9 PG
MCHC RBC AUTO-ENTMCNC: 33.2 G/DL
MCV RBC AUTO: 87 FL
MONOCYTES ABSOLUTE: 0.63 /ΜL
MONOCYTES RELATIVE PERCENT: 12.5 %
NEUTROPHILS ABSOLUTE: 2.23 /ΜL
NEUTROPHILS RELATIVE PERCENT: 44.3 %
NONHDLC SERPL-MCNC: ABNORMAL MG/DL
PDW BLD-RTO: 15.3 %
PLATELET # BLD: 304 K/ΜL
PMV BLD AUTO: ABNORMAL FL
POTASSIUM SERPL-SCNC: 5 MMOL/L
PROSTATE SPECIFIC ANTIGEN: 1.94 NG/ML
RBC # BLD: 5.12 10^6/ΜL
SODIUM BLD-SCNC: 143 MMOL/L
TOTAL PROTEIN: 7.3 G/DL (ref 6.4–8.2)
TRIGL SERPL-MCNC: 153 MG/DL
VLDLC SERPL CALC-MCNC: 31 MG/DL
WBC # BLD: 5 10^3/ML

## 2025-04-23 ENCOUNTER — OFFICE VISIT (OUTPATIENT)
Dept: FAMILY MEDICINE CLINIC | Age: 65
End: 2025-04-23
Payer: COMMERCIAL

## 2025-04-23 VITALS
WEIGHT: 200 LBS | OXYGEN SATURATION: 96 % | BODY MASS INDEX: 28.63 KG/M2 | TEMPERATURE: 98.4 F | HEIGHT: 70 IN | SYSTOLIC BLOOD PRESSURE: 136 MMHG | DIASTOLIC BLOOD PRESSURE: 82 MMHG | RESPIRATION RATE: 16 BRPM | HEART RATE: 76 BPM

## 2025-04-23 DIAGNOSIS — Z00.00 ENCOUNTER FOR WELL ADULT EXAM WITHOUT ABNORMAL FINDINGS: Primary | ICD-10-CM

## 2025-04-23 DIAGNOSIS — R74.8 ELEVATED LIVER ENZYMES: ICD-10-CM

## 2025-04-23 DIAGNOSIS — F51.01 PRIMARY INSOMNIA: ICD-10-CM

## 2025-04-23 DIAGNOSIS — L30.9 DERMATITIS: ICD-10-CM

## 2025-04-23 DIAGNOSIS — I10 PRIMARY HYPERTENSION: ICD-10-CM

## 2025-04-23 PROCEDURE — 99396 PREV VISIT EST AGE 40-64: CPT | Performed by: FAMILY MEDICINE

## 2025-04-23 PROCEDURE — 3079F DIAST BP 80-89 MM HG: CPT | Performed by: FAMILY MEDICINE

## 2025-04-23 PROCEDURE — 3075F SYST BP GE 130 - 139MM HG: CPT | Performed by: FAMILY MEDICINE

## 2025-04-23 RX ORDER — CLOBETASOL PROPIONATE 0.5 MG/G
OINTMENT TOPICAL
Qty: 60 G | Refills: 2 | Status: SHIPPED | OUTPATIENT
Start: 2025-04-23

## 2025-04-23 RX ORDER — TRAZODONE HYDROCHLORIDE 50 MG/1
50 TABLET ORAL NIGHTLY
Qty: 30 TABLET | Refills: 1 | Status: SHIPPED | OUTPATIENT
Start: 2025-04-23

## 2025-04-23 ASSESSMENT — ENCOUNTER SYMPTOMS
ABDOMINAL PAIN: 0
NAUSEA: 0
SINUS PRESSURE: 0
SORE THROAT: 0
VOMITING: 0
RHINORRHEA: 1
COUGH: 0
DIARRHEA: 0
WHEEZING: 0
CONSTIPATION: 0
SHORTNESS OF BREATH: 0
SINUS PAIN: 0

## 2025-04-23 NOTE — PROGRESS NOTES
136/82  Alon Leal (:  1960) is a 64 y.o. male,Established patient, here for evaluation of the following chief complaint(s):  Annual Exam and Discuss Labs      Subjective   SUBJECTIVE/OBJECTIVE:  HPI  Diet regular  Exercise going 3 days a week for weightlifting, some occasional cardio  Tobacco use denies  Alcohol use denies any recent use  BPs have been fine, occasionally a little high when anxious  Tolerating medications well  Taking medications every day yes, compliant  Did recently start a new OTC sleep medication, no other changes  Last Cr 0.94  Last K 5.0  Last   Last   Last glucose 104  AST 58 (was 41)   (was 61)    Review of Systems   Constitutional:  Positive for fatigue. Negative for activity change, appetite change, fever and unexpected weight change.   HENT:  Positive for rhinorrhea. Negative for congestion, ear pain, sinus pressure, sinus pain, sneezing and sore throat.    Eyes:  Negative for visual disturbance.   Respiratory:  Negative for cough, shortness of breath and wheezing.    Cardiovascular:  Negative for chest pain, palpitations and leg swelling.   Gastrointestinal:  Negative for abdominal pain, constipation, diarrhea, nausea and vomiting.   Genitourinary:  Negative for dysuria, flank pain, hematuria and urgency.   Musculoskeletal:  Negative for arthralgias and myalgias.   Skin:  Negative for rash.   Neurological:  Negative for dizziness, light-headedness and headaches.          Objective   Physical Exam  Vitals reviewed.   Constitutional:       General: He is not in acute distress.     Appearance: Normal appearance.   HENT:      Head: Normocephalic and atraumatic.      Right Ear: Tympanic membrane normal. No middle ear effusion. There is no impacted cerumen.      Left Ear: Tympanic membrane normal.  No middle ear effusion. There is no impacted cerumen.      Nose: Rhinorrhea present. No congestion. Rhinorrhea is clear.      Right Sinus: No maxillary sinus

## 2025-05-05 DIAGNOSIS — I10 PRIMARY HYPERTENSION: ICD-10-CM

## 2025-05-05 RX ORDER — AMLODIPINE BESYLATE 5 MG/1
5 TABLET ORAL DAILY
Qty: 90 TABLET | Refills: 3 | Status: SHIPPED | OUTPATIENT
Start: 2025-05-05

## 2025-05-05 NOTE — TELEPHONE ENCOUNTER
Alon Leal needs refill of   Requested Prescriptions     Pending Prescriptions Disp Refills    amLODIPine (NORVASC) 5 MG tablet 90 tablet 3     Sig: Take 1 tablet by mouth daily       Last Filled on:  05/08/2024 #90 R-3 sent to [facility] in [place] by Belinda Gordon DO.     Last Visit Date:  4/23/2025    Next Visit Date:  Visit date not found

## 2025-05-29 ENCOUNTER — TELEMEDICINE ON DEMAND (OUTPATIENT)
Age: 65
End: 2025-05-29
Payer: COMMERCIAL

## 2025-05-29 DIAGNOSIS — L29.89 PRURITIC ERYTHEMATOUS RASH: Primary | ICD-10-CM

## 2025-05-29 DIAGNOSIS — L23.3 ALLERGIC CONTACT DERMATITIS DUE TO SYSTEMIC MEDICAMENT: ICD-10-CM

## 2025-05-29 PROCEDURE — 99213 OFFICE O/P EST LOW 20 MIN: CPT | Performed by: NURSE PRACTITIONER

## 2025-05-29 RX ORDER — PRAMOXINE HYDROCHLORIDE 10 MG/ML
1 LOTION TOPICAL 4 TIMES DAILY PRN
Qty: 222 ML | Refills: 0 | Status: SHIPPED | OUTPATIENT
Start: 2025-05-29

## 2025-05-29 RX ORDER — HYDROXYZINE HYDROCHLORIDE 25 MG/1
25 TABLET, FILM COATED ORAL EVERY 6 HOURS PRN
Qty: 40 TABLET | Refills: 0 | Status: SHIPPED | OUTPATIENT
Start: 2025-05-29 | End: 2025-06-08

## 2025-05-29 ASSESSMENT — ENCOUNTER SYMPTOMS
COUGH: 0
VOMITING: 0
SORE THROAT: 0
SHORTNESS OF BREATH: 0
RHINORRHEA: 0
DIARRHEA: 0

## 2025-05-29 NOTE — PROGRESS NOTES
Alon Leal (:  1960) is a Established patient, here for evaluation of the following:    Rash (On chest x 1 days)       Assessment & Plan:  Below is the assessment and plan developed based on review of pertinent history, physical exam, labs, studies, and medications.  1. Pruritic erythematous rash  -     hydrOXYzine HCl (ATARAX) 25 MG tablet; Take 1 tablet by mouth every 6 hours as needed for Itching, Disp-40 tablet, R-0Normal  -     Pramoxine HCl (CERAVE ITCH RELIEF) 1 % LOTN; Apply 1 Application topically 4 times daily as needed (itching), Disp-222 mL, R-0Normal  2. Allergic contact dermatitis due to systemic medicament  -     hydrOXYzine HCl (ATARAX) 25 MG tablet; Take 1 tablet by mouth every 6 hours as needed for Itching, Disp-40 tablet, R-0Normal  -     Pramoxine HCl (CERAVE ITCH RELIEF) 1 % LOTN; Apply 1 Application topically 4 times daily as needed (itching), Disp-222 mL, R-0Normal  Advised patient to stop liver cleanse supplement. Use the anti-histamine prescribed. He can use the steroid he has already as prescribed. And he can add the anti-itch lotion I have sent to his pharmacy. Follow up next week or sooner if symptoms worsen.  The patient would benefit from future follow up with their usual PCP. As of the end of their Virtualist Visit today, follow up visit status is as follows: Scheduled by Virtualist    Return in about 1 week (around 2025) for rash.    Subjective:   Patient woke up with itching on his chest and noted a rash yesterday. He applied ketoconazole. He started taking a \"liver health\" vitamin approximately 20 days ago, Hepagard- Milk Thistle, Dandelion root.    Rash  This is a new problem. The current episode started yesterday. The problem has been gradually worsening since onset. The affected locations include the chest, torso and abdomen. The rash is characterized by itchiness and redness. He was exposed to nothing. Pertinent negatives include no anorexia, congestion, cough,

## 2025-05-30 ENCOUNTER — RESULTS FOLLOW-UP (OUTPATIENT)
Dept: FAMILY MEDICINE CLINIC | Age: 65
End: 2025-05-30

## 2025-05-30 DIAGNOSIS — R74.8 ELEVATED LIVER ENZYMES: Primary | ICD-10-CM

## 2025-05-30 DIAGNOSIS — R74.8 ELEVATED LIVER ENZYMES: ICD-10-CM

## 2025-05-30 LAB
ALT SERPL-CCNC: 90 U/L
AST SERPL-CCNC: 54 U/L

## 2025-06-05 ENCOUNTER — OFFICE VISIT (OUTPATIENT)
Dept: FAMILY MEDICINE CLINIC | Age: 65
End: 2025-06-05
Payer: COMMERCIAL

## 2025-06-05 VITALS
HEIGHT: 70 IN | HEART RATE: 64 BPM | WEIGHT: 209.6 LBS | BODY MASS INDEX: 30.01 KG/M2 | DIASTOLIC BLOOD PRESSURE: 72 MMHG | RESPIRATION RATE: 16 BRPM | SYSTOLIC BLOOD PRESSURE: 124 MMHG | OXYGEN SATURATION: 96 % | TEMPERATURE: 98.5 F

## 2025-06-05 DIAGNOSIS — R74.8 ELEVATED LIVER ENZYMES: Primary | ICD-10-CM

## 2025-06-05 DIAGNOSIS — L30.9 DERMATITIS: ICD-10-CM

## 2025-06-05 DIAGNOSIS — F51.01 PRIMARY INSOMNIA: ICD-10-CM

## 2025-06-05 PROCEDURE — 99213 OFFICE O/P EST LOW 20 MIN: CPT | Performed by: FAMILY MEDICINE

## 2025-06-05 PROCEDURE — 3074F SYST BP LT 130 MM HG: CPT | Performed by: FAMILY MEDICINE

## 2025-06-05 PROCEDURE — 3078F DIAST BP <80 MM HG: CPT | Performed by: FAMILY MEDICINE

## 2025-06-05 RX ORDER — TRAZODONE HYDROCHLORIDE 50 MG/1
TABLET ORAL
Qty: 180 TABLET | Refills: 1 | Status: SHIPPED | OUTPATIENT
Start: 2025-06-05

## 2025-06-05 RX ORDER — CLOBETASOL PROPIONATE 0.5 MG/G
OINTMENT TOPICAL
Qty: 60 G | Refills: 2 | Status: SHIPPED | OUTPATIENT
Start: 2025-06-05

## 2025-06-05 ASSESSMENT — ENCOUNTER SYMPTOMS
SHORTNESS OF BREATH: 0
CONSTIPATION: 0
DIARRHEA: 0
ABDOMINAL PAIN: 0
NAUSEA: 0
VOMITING: 0
WHEEZING: 0

## 2025-06-05 NOTE — PROGRESS NOTES
Alon Leal (:  1960) is a 64 y.o. male,Established patient, here for evaluation of the following chief complaint(s):  Follow-up (rash), Medication Refill, and Discuss Labs      Subjective   SUBJECTIVE/OBJECTIVE:  HPI  Patient presents for evaluation of rash---has completely resolved at this time  Risk factors started milk thistle prior to rash developing  Treatments hydroxyzine, Cerave itch relief, clobetasol  ALT 90 (was 114)  AST 54 (was 58)   (was 116)  Denies any abdominal pain  Does remember having runny nose prior to labs in March and taking some Nyquil  Still has his gallbladder but denies any RUQ pain, would prefer to avoid doing US at this time    Review of Systems   Constitutional:  Positive for fatigue. Negative for activity change, appetite change and unexpected weight change. Diaphoresis: mild.  Respiratory:  Negative for shortness of breath and wheezing.    Cardiovascular:  Positive for leg swelling (still minor from surgery in October on the right leg). Negative for chest pain and palpitations.   Gastrointestinal:  Negative for abdominal pain, constipation, diarrhea, nausea and vomiting.   Skin:  Negative for rash (resolved).   Neurological:  Negative for dizziness, light-headedness and headaches.          Objective   Physical Exam  Vitals reviewed.   Constitutional:       General: He is not in acute distress.     Appearance: Normal appearance. He is not ill-appearing.   Cardiovascular:      Rate and Rhythm: Normal rate and regular rhythm.      Heart sounds: No murmur heard.     No gallop.   Pulmonary:      Effort: Pulmonary effort is normal.      Breath sounds: Normal breath sounds. No wheezing or rhonchi.   Abdominal:      General: Abdomen is flat. Bowel sounds are normal. There is no distension.      Palpations: Abdomen is soft.      Tenderness: There is no abdominal tenderness. There is no guarding.   Musculoskeletal:      Right lower leg: No edema.      Left lower leg: No

## 2025-06-19 NOTE — TELEPHONE ENCOUNTER
From: Alon Leal  To: Dr. Belinda Gordon  Sent: 7/29/2024 8:48 AM EDT  Subject: Right Thigh    Dr Gordon:  From my last visit regarding my right thigh being larger than the left one.  I have been wearing a compression sleeve on it.  I don think it has gotten any larger.  But I wanted to ask your opinion about going ahead and meeting with someone to see of there is anything that can be done to decrease the size?  I can keep wearing the sleeve or do you think i need to keep wearing it?  Please let me know what you would suggest?  Thank you  
If you feel like the compression sleeve has helped then I would consider seeing vein specialist---we can do a referral for a consult to get a second opinion.  If it isn't getting any bigger then I would recommend that you continue to wear the compression stocking until you see the specialist.  I can start a referral now       
[facility]

## 2025-07-22 LAB
ALT SERPL-CCNC: 60 U/L
AST SERPL-CCNC: 38 U/L

## 2025-07-23 ENCOUNTER — RESULTS FOLLOW-UP (OUTPATIENT)
Dept: FAMILY MEDICINE CLINIC | Age: 65
End: 2025-07-23